# Patient Record
Sex: MALE | URBAN - METROPOLITAN AREA
[De-identification: names, ages, dates, MRNs, and addresses within clinical notes are randomized per-mention and may not be internally consistent; named-entity substitution may affect disease eponyms.]

---

## 2023-10-27 ENCOUNTER — CLAIMS CREATED FROM THE CLAIM WINDOW (OUTPATIENT)
Dept: URBAN - METROPOLITAN AREA CLINIC 54 | Facility: CLINIC | Age: 53
End: 2023-10-27
Payer: COMMERCIAL

## 2023-10-27 ENCOUNTER — LAB OUTSIDE AN ENCOUNTER (OUTPATIENT)
Dept: URBAN - METROPOLITAN AREA CLINIC 54 | Facility: CLINIC | Age: 53
End: 2023-10-27

## 2023-10-27 VITALS
TEMPERATURE: 97.3 F | WEIGHT: 254 LBS | HEIGHT: 72 IN | DIASTOLIC BLOOD PRESSURE: 93 MMHG | BODY MASS INDEX: 34.4 KG/M2 | HEART RATE: 61 BPM | SYSTOLIC BLOOD PRESSURE: 124 MMHG

## 2023-10-27 DIAGNOSIS — R13.19 ESOPHAGEAL DYSPHAGIA: ICD-10-CM

## 2023-10-27 DIAGNOSIS — K58.0 IRRITABLE BOWEL SYNDROME WITH DIARRHEA: ICD-10-CM

## 2023-10-27 PROBLEM — 197125005: Status: ACTIVE | Noted: 2023-10-27

## 2023-10-27 PROCEDURE — 99244 OFF/OP CNSLTJ NEW/EST MOD 40: CPT

## 2023-10-27 PROCEDURE — 99204 OFFICE O/P NEW MOD 45 MIN: CPT

## 2023-10-27 RX ORDER — PANTOPRAZOLE SODIUM 40 MG/1
1 TABLET 30 MINUTES PRIOR TO MORNING MEAL TABLET, DELAYED RELEASE ORAL ONCE A DAY
Qty: 30 | Refills: 1 | OUTPATIENT
Start: 2023-10-27

## 2023-10-27 NOTE — HPI-TODAY'S VISIT:
10/27/23: Patient was referred by KENDRA Rodriguez for dysphagia. A copy of this document will be sent to the provider. Patient is a 51 yo male with a PMH of HLD and IBS for nonprogressive dysphagia. Having difficulty swallowing solids x 1 year, occurring about once or twice a week. Mostly with breads. Typically pt cannot tolerate liquids or secretions and has to force regurgitation. Sometimes obtains relief with repeated swallowing of liquids. Denies any odynophagia,  n/v, chest pain or weight loss. Previously had frequent heartburn/reflux but that has subsided. Took omeprazole in the past with good relief but eventually developed diarrhea and stopped. Nonsmoker. No prior EGD.

## 2023-11-20 ENCOUNTER — OFFICE VISIT (OUTPATIENT)
Dept: URBAN - METROPOLITAN AREA SURGERY CENTER 14 | Facility: SURGERY CENTER | Age: 53
End: 2023-11-20

## 2023-11-20 ENCOUNTER — CLAIMS CREATED FROM THE CLAIM WINDOW (OUTPATIENT)
Dept: URBAN - METROPOLITAN AREA SURGERY CENTER 14 | Facility: SURGERY CENTER | Age: 53
End: 2023-11-20
Payer: COMMERCIAL

## 2023-11-20 ENCOUNTER — CLAIMS CREATED FROM THE CLAIM WINDOW (OUTPATIENT)
Dept: URBAN - METROPOLITAN AREA CLINIC 4 | Facility: CLINIC | Age: 53
End: 2023-11-20
Payer: COMMERCIAL

## 2023-11-20 DIAGNOSIS — K31.89 ACHYLIA: ICD-10-CM

## 2023-11-20 DIAGNOSIS — R13.10 DYSPHAGIA: ICD-10-CM

## 2023-11-20 DIAGNOSIS — R13.19 CERVICAL DYSPHAGIA: ICD-10-CM

## 2023-11-20 DIAGNOSIS — K31.89 OTHER DISEASES OF STOMACH AND DUODENUM: ICD-10-CM

## 2023-11-20 DIAGNOSIS — K22.2 ACQUIRED ESOPHAGEAL RING: ICD-10-CM

## 2023-11-20 DIAGNOSIS — K22.2 SCHATZKI'S RING: ICD-10-CM

## 2023-11-20 DIAGNOSIS — K31.89 MUCOSAL ABNORMALITY OF STOMACH: ICD-10-CM

## 2023-11-20 PROCEDURE — 43248 EGD GUIDE WIRE INSERTION: CPT | Performed by: INTERNAL MEDICINE

## 2023-11-20 PROCEDURE — G8907 PT DOC NO EVENTS ON DISCHARG: HCPCS | Performed by: INTERNAL MEDICINE

## 2023-11-20 PROCEDURE — 43239 EGD BIOPSY SINGLE/MULTIPLE: CPT | Performed by: INTERNAL MEDICINE

## 2023-11-20 PROCEDURE — 00731 ANES UPR GI NDSC PX NOS: CPT | Performed by: REGISTERED NURSE

## 2023-11-20 PROCEDURE — 88305 TISSUE EXAM BY PATHOLOGIST: CPT | Performed by: PATHOLOGY

## 2023-11-20 RX ORDER — PANTOPRAZOLE SODIUM 40 MG/1
1 TABLET 30 MINUTES PRIOR TO MORNING MEAL TABLET, DELAYED RELEASE ORAL ONCE A DAY
Qty: 30 | Refills: 1 | Status: ACTIVE | COMMUNITY
Start: 2023-10-27

## 2023-12-04 ENCOUNTER — OFFICE VISIT (OUTPATIENT)
Dept: URBAN - METROPOLITAN AREA CLINIC 54 | Facility: CLINIC | Age: 53
End: 2023-12-04

## 2023-12-04 RX ORDER — PANTOPRAZOLE SODIUM 40 MG/1
1 TABLET 30 MINUTES PRIOR TO MORNING MEAL TABLET, DELAYED RELEASE ORAL ONCE A DAY
Qty: 30 | Refills: 1 | Status: ACTIVE | COMMUNITY
Start: 2023-10-27

## 2023-12-05 ENCOUNTER — OFFICE VISIT (OUTPATIENT)
Dept: URBAN - METROPOLITAN AREA CLINIC 54 | Facility: CLINIC | Age: 53
End: 2023-12-05
Payer: COMMERCIAL

## 2023-12-05 ENCOUNTER — DASHBOARD ENCOUNTERS (OUTPATIENT)
Age: 53
End: 2023-12-05

## 2023-12-05 VITALS
TEMPERATURE: 97.3 F | HEART RATE: 68 BPM | BODY MASS INDEX: 33.86 KG/M2 | SYSTOLIC BLOOD PRESSURE: 126 MMHG | HEIGHT: 72 IN | WEIGHT: 250 LBS | DIASTOLIC BLOOD PRESSURE: 77 MMHG

## 2023-12-05 DIAGNOSIS — K58.0 IRRITABLE BOWEL SYNDROME WITH DIARRHEA: ICD-10-CM

## 2023-12-05 DIAGNOSIS — R12 HEARTBURN: ICD-10-CM

## 2023-12-05 DIAGNOSIS — K22.2 SCHATZKI'S RING: ICD-10-CM

## 2023-12-05 DIAGNOSIS — R13.19 ESOPHAGEAL DYSPHAGIA: ICD-10-CM

## 2023-12-05 PROBLEM — 235623002: Status: ACTIVE | Noted: 2023-12-05

## 2023-12-05 PROCEDURE — 99213 OFFICE O/P EST LOW 20 MIN: CPT

## 2023-12-05 RX ORDER — PANTOPRAZOLE SODIUM 40 MG/1
1 TABLET 30 MINUTES PRIOR TO MORNING MEAL TABLET, DELAYED RELEASE ORAL ONCE A DAY
Qty: 30 | Refills: 1 | Status: ON HOLD | COMMUNITY
Start: 2023-10-27

## 2023-12-05 RX ORDER — PANTOPRAZOLE SODIUM 40 MG/1
1 TABLET 30 MINUTES PRIOR TO MORNING MEAL TABLET, DELAYED RELEASE ORAL
Qty: 90 | Refills: 1
Start: 2023-10-27

## 2023-12-05 NOTE — HPI-TODAY'S VISIT:
10/27/23: Patient was referred by KENDRA Rodriguez for dysphagia. A copy of this document will be sent to the provider. Patient is a 53 yo male with a PMH of HLD and IBS for nonprogressive dysphagia. Having difficulty swallowing solids x 1 year, occurring about once or twice a week. Mostly with breads. Typically pt cannot tolerate liquids or secretions and has to force regurgitation. Sometimes obtains relief with repeated swallowing of liquids. Denies any odynophagia,  n/v, chest pain or weight loss. Previously had frequent heartburn/reflux but that has subsided. Took omeprazole in the past with good relief but eventually developed diarrhea and stopped. Nonsmoker. No prior EGD.  12/5/23: Pt RTC for EGD f/u. Dysphagia resolved mostly with pantoprazole. Schatzki's ring was dilated. Pt d/c PPI after 1 month. Has come reflux depending on diet (hot wings caused flare). Metamucil has worked well for diarrhea.  EGD 11/20/23: - The examined portions of the nasopharynx, oropharynx and larynx were normal. - 2 cm hiatal hernia. - Low-grade of narrowing Schatzki ring. Dilated. - Erythematous mucosa in the greater curvature. Biopsied. - Normal examined duodenum. Biopsy: No significant abnormality

## 2023-12-05 NOTE — PHYSICAL EXAM CHEST:
no lesions, no deformities, no traumatic injuries, no significant scars are present, chest wall non-tender, no masses present, breathing is unlabored without accessory muscle use,normal breath sounds Passed